# Patient Record
(demographics unavailable — no encounter records)

---

## 2024-10-25 NOTE — HISTORY OF PRESENT ILLNESS
[Mother] : mother [Normal] : Normal [Sippy cup use] : Sippy cup use [Vitamin] : Primary Fluoride Source: Vitamin [In nursery school] : In nursery school [No] : Not at  exposure [Water heater temperature set at <120 degrees F] : Water heater temperature set at <120 degrees F [Car seat in back seat] : Car seat in back seat [Smoke Detectors] : Smoke detectors [Supervised play near cars and streets] : No supervised play near cars and streets [Carbon Monoxide Detectors] : Carbon monoxide detectors [Exposure to electronic nicotine delivery system] : Exposure to electronic nicotine delivery system [Up to date] : Up to date [FreeTextEntry7] : 3 yo well exam [de-identified] : good diet [NO] : No

## 2024-10-25 NOTE — DISCUSSION/SUMMARY
[Normal Growth] : growth [Normal Development] : development [None] : No known medical problems [No Elimination Concerns] : elimination [No Feeding Concerns] : feeding [No Skin Concerns] : skin [Normal Sleep Pattern] : sleep [Family Support] : family support [Encouraging Literacy Activities] : encouraging literacy activities [Playing with Peers] : playing with peers [Promoting Physical Activity] : promoting physical activity [Safety] : safety [No Medications] : ~He/She~ is not on any medications [Parent/Guardian] : parent/guardian [] : The components of the vaccine(s) to be administered today are listed in the plan of care. The disease(s) for which the vaccine(s) are intended to prevent and the risks have been discussed with the caretaker.  The risks are also included in the appropriate vaccination information statements which have been provided to the patient's caregiver.  The caregiver has given consent to vaccinate. [FreeTextEntry1] : FLU vac given today Provided counseling on the diseases to be vaccinated against as well as the risks/benefits of providing and withholding recommended vaccines to be given today to ANAM .All questions were answered and the parent verbalized understanding.  HEARING NWL  PEDIAVISION WNL   CBC sent to lab  SWYC NL  TB NEG  LEAD NEG SCREEN  UA NEG   Discussed safety/feeding/sleep as appropriate for age.  Time allowed for questions and all answered with understanding.  RX PVF .5 MG

## 2024-10-25 NOTE — PHYSICAL EXAM

## 2024-10-25 NOTE — DEVELOPMENTAL MILESTONES
